# Patient Record
Sex: MALE | Race: WHITE | Employment: FULL TIME | URBAN - METROPOLITAN AREA
[De-identification: names, ages, dates, MRNs, and addresses within clinical notes are randomized per-mention and may not be internally consistent; named-entity substitution may affect disease eponyms.]

---

## 2017-12-08 ENCOUNTER — OFFICE VISIT (OUTPATIENT)
Dept: OTOLARYNGOLOGY | Facility: CLINIC | Age: 47
End: 2017-12-08

## 2017-12-08 VITALS
HEIGHT: 72 IN | SYSTOLIC BLOOD PRESSURE: 112 MMHG | DIASTOLIC BLOOD PRESSURE: 72 MMHG | BODY MASS INDEX: 25.06 KG/M2 | WEIGHT: 185 LBS | TEMPERATURE: 98 F

## 2017-12-08 DIAGNOSIS — R09.81 NASAL CONGESTION: Primary | ICD-10-CM

## 2017-12-08 DIAGNOSIS — R07.0 THROAT PAIN IN ADULT: ICD-10-CM

## 2017-12-08 PROCEDURE — 99203 OFFICE O/P NEW LOW 30 MIN: CPT | Performed by: OTOLARYNGOLOGY

## 2017-12-08 PROCEDURE — 99212 OFFICE O/P EST SF 10 MIN: CPT | Performed by: OTOLARYNGOLOGY

## 2017-12-08 PROCEDURE — 31575 DIAGNOSTIC LARYNGOSCOPY: CPT | Performed by: OTOLARYNGOLOGY

## 2017-12-08 RX ORDER — RANITIDINE 150 MG/1
TABLET ORAL
COMMUNITY
Start: 2017-11-20

## 2017-12-08 RX ORDER — FLUTICASONE PROPIONATE 50 MCG
SPRAY, SUSPENSION (ML) NASAL
COMMUNITY
Start: 2017-10-20 | End: 2017-12-29

## 2017-12-08 RX ORDER — LORATADINE 10 MG/1
10 TABLET ORAL DAILY
Qty: 30 TABLET | Refills: 3 | Status: SHIPPED | OUTPATIENT
Start: 2017-12-08

## 2017-12-08 RX ORDER — OMEPRAZOLE 40 MG/1
CAPSULE, DELAYED RELEASE ORAL
COMMUNITY
Start: 2017-12-04

## 2017-12-08 RX ORDER — MONTELUKAST SODIUM 10 MG/1
10 TABLET ORAL NIGHTLY
Qty: 30 TABLET | Refills: 3 | Status: SHIPPED | OUTPATIENT
Start: 2017-12-08

## 2017-12-08 NOTE — PROGRESS NOTES
Kenji Luna is a 50year old male.   Patient presents with:  Throat Problem: burning sensation and pt feels there is something in his throat for 3-4 months       HISTORY OF PRESENT ILLNESS    He presents with a 3 to four-month history of a burning sensation i intolerance and heat intolerance. Neuro Negative Tremors. Psych Negative Anxiety and depression. Integumentary Negative Frequent skin infections, pigment change and rash. Hema/Lymph Negative Easy bleeding and easy bruising.            PHYSICAL EXAM sprayed into the nose. Laryngoscopy:  Flexible Fiberoptic Laryngoscopy: A diagnostic flexible fiberoptic laryngoscopy was performed. The flexible fiberoptic laryngoscope was placed into the nose or mouthand advanced  into the interior of the larynx.  A t

## 2017-12-18 ENCOUNTER — TELEPHONE (OUTPATIENT)
Dept: OTOLARYNGOLOGY | Facility: CLINIC | Age: 47
End: 2017-12-18

## 2017-12-29 RX ORDER — FLUTICASONE PROPIONATE 50 MCG
1 SPRAY, SUSPENSION (ML) NASAL 2 TIMES DAILY
Qty: 1 BOTTLE | Refills: 3 | Status: SHIPPED | OUTPATIENT
Start: 2017-12-29

## 2018-01-19 ENCOUNTER — OFFICE VISIT (OUTPATIENT)
Dept: OTOLARYNGOLOGY | Facility: CLINIC | Age: 48
End: 2018-01-19

## 2018-01-19 VITALS
HEIGHT: 72 IN | TEMPERATURE: 98 F | SYSTOLIC BLOOD PRESSURE: 114 MMHG | DIASTOLIC BLOOD PRESSURE: 80 MMHG | WEIGHT: 185 LBS | BODY MASS INDEX: 25.06 KG/M2

## 2018-01-19 DIAGNOSIS — J34.2 DEVIATED NASAL SEPTUM: ICD-10-CM

## 2018-01-19 DIAGNOSIS — R07.0 THROAT PAIN IN ADULT: Primary | ICD-10-CM

## 2018-01-19 PROCEDURE — 99212 OFFICE O/P EST SF 10 MIN: CPT | Performed by: OTOLARYNGOLOGY

## 2018-01-19 PROCEDURE — 99214 OFFICE O/P EST MOD 30 MIN: CPT | Performed by: OTOLARYNGOLOGY

## 2018-01-19 NOTE — PROGRESS NOTES
Alejo Clark is a 50year old male. Patient presents with:   Follow - Up: regarding throat pain, no change in symptoms with medications       HISTORY OF PRESENT ILLNESS  He presents with a 3 to four-month history of a burning sensation in the back of his thro OTHER SURGICAL HISTORY Left      Comment: lymph nodes removed from left groin   2015: OTHER SURGICAL HISTORY Left      Comment: achilles tendon      REVIEW OF SYSTEMS    System Neg/Pos Details   Constitutional Negative Fatigue, fever and weight loss.    ENM Nose/Mouth/Throat Normal External nose - Normal. Lips/teeth/gums - Normal. Tonsils - Normal. Oropharynx - Normal.   Nose/Mouth/Throat Normal Nares - Right: Normal Left: Normal. Septum -Normal  Turbinates - Right: Normal, Left: Normal.       Current Outpa

## 2018-01-22 ENCOUNTER — TELEPHONE (OUTPATIENT)
Dept: OTOLARYNGOLOGY | Facility: CLINIC | Age: 48
End: 2018-01-22

## 2018-01-22 RX ORDER — METHYLPREDNISOLONE 4 MG/1
TABLET ORAL
Qty: 1 PACKAGE | Refills: 0 | Status: SHIPPED | OUTPATIENT
Start: 2018-01-22

## 2018-01-22 NOTE — TELEPHONE ENCOUNTER
Dr. Kenia Colindres, per your note from 01/19/18 , medrol dose pack sent to pharmacy,   2. Deviated nasal septum    At this time I have recommended a Medrol Dosepak for 6 days and he will contact me in about 2 weeks to let me know how he is doing.   He may benefit f

## 2018-02-02 ENCOUNTER — TELEPHONE (OUTPATIENT)
Dept: OTOLARYNGOLOGY | Facility: CLINIC | Age: 48
End: 2018-02-02

## 2018-02-02 NOTE — TELEPHONE ENCOUNTER
pt called. LOV 1/19/18 with WINIFRED. He was advised to call to let you know how he is doing on RX meds. Please advise.

## 2018-02-03 NOTE — TELEPHONE ENCOUNTER
Pt's LOV 1/19/18, throat pain in adult, deviated septum. Per pt he still has the same discomfort in his throat; cycles between a feeling that he has something caught in his throat and at times may be painful.   Advised pt to continue his other meds at this

## 2018-02-05 NOTE — TELEPHONE ENCOUNTER
LMTCB. Please give pt number to Gastroenterology, Dr. Adonis Mejia:  23-14-20-09 so he may schedule evaluation.

## 2018-02-05 NOTE — TELEPHONE ENCOUNTER
According to Dr. Michele Collins note, probably would recommend a GI eval and then can consider imaging if needed

## 2018-02-07 NOTE — TELEPHONE ENCOUNTER
LMTCB.   Please give pt number to Gastroenterology, Dr. Shyanne Allison:  23-14-20-09 so he may schedule evaluation.

## 2018-02-08 ENCOUNTER — TELEPHONE (OUTPATIENT)
Dept: OTOLARYNGOLOGY | Facility: CLINIC | Age: 48
End: 2018-02-08

## 2018-02-08 NOTE — TELEPHONE ENCOUNTER
Pt was referred to another doctor, dr Ed Prado. Should pt continue taking medication, refill  or wait to be see by dr Ed Prado. appt is scheduled 3-22-18.    Please call pt to advise

## 2018-02-09 NOTE — TELEPHONE ENCOUNTER
I would recommend that he continue these medications and see me towards the end of February or early March to see how he is doing before he sees his GI doctor.

## 2019-05-10 ENCOUNTER — HOSPITAL ENCOUNTER (OUTPATIENT)
Age: 49
Discharge: HOME | End: 2019-05-10
Attending: EMERGENCY MEDICINE
Payer: COMMERCIAL

## 2019-05-10 ENCOUNTER — APPOINTMENT (OUTPATIENT)
Dept: GENERAL RADIOLOGY | Age: 49
End: 2019-05-10
Attending: EMERGENCY MEDICINE
Payer: COMMERCIAL

## 2019-05-10 VITALS — BODY MASS INDEX: 27.09 KG/M2 | HEIGHT: 72 IN | WEIGHT: 200 LBS

## 2019-05-10 DIAGNOSIS — S80.219A ABRASION OF KNEE, UNSPECIFIED LATERALITY, INITIAL ENCOUNTER: ICD-10-CM

## 2019-05-10 DIAGNOSIS — S80.02XA CONTUSION OF LEFT KNEE, INITIAL ENCOUNTER: Primary | ICD-10-CM

## 2019-05-10 PROCEDURE — 73560 X-RAY EXAM OF KNEE 1 OR 2: CPT | Performed by: EMERGENCY MEDICINE

## 2019-05-10 PROCEDURE — 99203 OFFICE O/P NEW LOW 30 MIN: CPT

## 2019-05-10 PROCEDURE — 99213 OFFICE O/P EST LOW 20 MIN: CPT

## 2019-05-10 NOTE — ED PROVIDER NOTES
Patient Seen in: [address]    History   Patient presents with:  Lower Extremity Injury (musculoskeletal)    Stated Complaint: left knee injury    HPI    25-year-old male without significant past medical history presents erythema or drainage noted. There is a moderate effusion noted to the left knee. Tenderness to palpation to the patella. No tibial or joint line tenderness noted. No laxity noted to the knee.   No other joint tenderness is noted to palpation or range of

## 2019-05-10 NOTE — ED INITIAL ASSESSMENT (HPI)
Pt in [address] [place] [address] by self for left knee injury occurred 1 week ago. He reports pain and swelling. Has taken aleve for pain. Able to bear weight. Stiffens with walking.

## 2019-05-10 NOTE — ED NOTES
Pt discharged home stable and in good condition by self with knee brace on left knee. Reviewed pain and avs. Follow up with ortho. Pt verbalized understanding and agreed.

## 2019-09-20 NOTE — TELEPHONE ENCOUNTER
Current Outpatient Prescriptions:  Fluticasone Propionate 50 MCG/ACT Nasal Suspension  Disp:  Rfl: Please renew Novolog as 8-20 units tid w/ meals as directed for 6 months; ok to renew pen needles for one year. Thanks, SD

## 2022-06-13 ENCOUNTER — OFFICE VISIT (OUTPATIENT)
Dept: FAMILY MEDICINE CLINIC | Facility: CLINIC | Age: 52
End: 2022-06-13
Payer: COMMERCIAL

## 2022-06-13 VITALS
HEIGHT: 72 IN | HEART RATE: 65 BPM | WEIGHT: 186 LBS | BODY MASS INDEX: 25.19 KG/M2 | SYSTOLIC BLOOD PRESSURE: 128 MMHG | OXYGEN SATURATION: 98 % | DIASTOLIC BLOOD PRESSURE: 68 MMHG

## 2022-06-13 DIAGNOSIS — Z00.00 PHYSICAL EXAM, ANNUAL: Primary | ICD-10-CM

## 2022-06-13 DIAGNOSIS — Z12.11 COLON CANCER SCREENING: ICD-10-CM

## 2022-06-13 DIAGNOSIS — L98.9 SKIN LESION: ICD-10-CM

## 2022-06-13 PROCEDURE — 3074F SYST BP LT 130 MM HG: CPT | Performed by: FAMILY MEDICINE

## 2022-06-13 PROCEDURE — 3078F DIAST BP <80 MM HG: CPT | Performed by: FAMILY MEDICINE

## 2022-06-13 PROCEDURE — 99386 PREV VISIT NEW AGE 40-64: CPT | Performed by: FAMILY MEDICINE

## 2022-06-13 PROCEDURE — 3008F BODY MASS INDEX DOCD: CPT | Performed by: FAMILY MEDICINE

## 2022-06-16 ENCOUNTER — MED REC SCAN ONLY (OUTPATIENT)
Dept: FAMILY MEDICINE CLINIC | Facility: CLINIC | Age: 52
End: 2022-06-16

## 2022-06-23 NOTE — PROGRESS NOTES
OV with Dr Carline Downs 06/13/22    Referred to GI for first colonoscopy    No GI symptoms      Anticoagulants:  Diabetic Meds:   BP meds(Ace inhibitors/ARB's):  Weight loss meds (phentermine/vyvanse):  Iron supplement (RX/OTC):  Height & Weight/BMI: 6'/186/25.22  Hx of Cardiac/CVA issues/(MI/Stroke):  Devices Pacemaker/Defibrillator/Stents:  Resp. Issues/Oxygen Use/NUVIA/COPD:  Issues w/Anesthesia:    Symptoms (Y/N):   Symptoms Details:     Special comments/notes:    Please advise on orders and prep, thank you.

## 2022-06-24 ENCOUNTER — NURSE ONLY (OUTPATIENT)
Dept: GASTROENTEROLOGY | Facility: CLINIC | Age: 52
End: 2022-06-24

## 2022-06-24 DIAGNOSIS — Z12.11 SCREEN FOR COLON CANCER: Primary | ICD-10-CM

## 2022-06-24 NOTE — PROGRESS NOTES
Daniel Eng patient for a scheduled telephone colon screening. GI MD preference: [id number]:   [facility]  PCP visit on: 6/13/2022    First colonoscopy: yes     Anticoagulants: no   Diabetic Meds:  No   BP meds(Ace inhibitors/ARB's): no   Weight loss meds (phentermine/vyvanse): no   Iron supplement (RX/OTC): no   Height & Weight/BMI: 6'/186lbs/25  Hx of Cardiac/CVA issues/(MI/Stroke): no  Devices Pacemaker/Defibrillator/Stents: no   Resp. Issues/Oxygen Use/NUVIA/COPD: no   Issues w/Anesthesia: no    Symptoms (Y/N): no     Family history of colon cancer: no     Medications, pharmacy, and allergies verified with patient over the phone. Please advise on orders and prep, thank you.

## 2022-06-29 RX ORDER — POLYETHYLENE GLYCOL 3350, SODIUM CHLORIDE, SODIUM BICARBONATE, POTASSIUM CHLORIDE 420; 11.2; 5.72; 1.48 G/4L; G/4L; G/4L; G/4L
POWDER, FOR SOLUTION ORAL
Qty: 4000 ML | Refills: 0 | Status: SHIPPED | OUTPATIENT
Start: 2022-06-29

## 2022-06-29 NOTE — PROGRESS NOTES
Scheduling:  cln w/ mac or IV w/ Dr. Gracie Hoyos  Dx: crc screening   trilyte split dose sent e-scribe

## 2022-07-01 NOTE — PROGRESS NOTES
Scheduled for:  Colonoscopy-screen 24345    Provider Name:  Dr. Romelia Gaines   Date:  8/10/2022  Location:  [facility]  Sedation:  IV   Time:  2:45 PM (pt is aware to arrive at 1:45 PM)  Prep:  Split dose trilyte   Meds/Allergies Reconciled?:  amilcar [place] reviewed   Diagnosis with codes:  Colorectal cancer screening Z12.11  Was patient informed to call insurance with codes (Y/N):  I confirmed [employer] with this patient. Referral sent?:  Referral was sent at the time of electronic surgical scheduling. [facility] or [facility] notified?:  Electronic case request was sent to [facility] THE [facility] via panpan. Medication Orders:  n/a  Misc Orders:  Patient was informed that they will need a COVID 19 test prior to their procedure. Patient verbally understood & will await a phone call from [facility] to schedule. Further instructions given by staff:  I discussed the prep instructions with the patient which he verbally understood and is aware that I will sent the instructions today via [address]

## 2022-08-09 ENCOUNTER — TELEPHONE (OUTPATIENT)
Dept: GASTROENTEROLOGY | Facility: CLINIC | Age: 52
End: 2022-08-09

## 2022-08-09 DIAGNOSIS — Z12.11 COLON CANCER SCREENING: Primary | ICD-10-CM

## 2022-08-09 NOTE — TELEPHONE ENCOUNTER
Patient has colonoscopy tomorrow and indicates his wife tested positive for covid today. Patient asking what needs to be done next, if he needs to test or reschedule.  Please call at [phone],[facility],

## 2022-08-09 NOTE — TELEPHONE ENCOUNTER
Contacted ANDRES RN in [facility] and spoke to [place] regarding patient's wife testing positive for covid today. States patietn will need to be rescheduled at least 22 days from today. GI Schedulers--    Please see TE 6/24/22 to reschedule patient 22 days out from today. Thank you!

## 2022-08-10 NOTE — TELEPHONE ENCOUNTER
Dr Ketan Osman   Patients procedure has been rescheduled further out due to wife of patient has come back covid positive     Patient had already opened his bowel prep and is requesting to resend a new prescription over to his [facility] pharmacy on file     Thank you     [id number] [address] [facility] [address] [facility] AT [address] [place] [address], [phone] [address]   Phone: [phone] Fax: [phone]   Hours: Not open 24 hours

## 2022-08-10 NOTE — TELEPHONE ENCOUNTER
Patients case rescheduled further out due to wife positive with Covid case rescheduled at [address] and misc report completed         Rescheduled for:  Colonoscopy-screen [address]57    Provider Name:  Dr. Elva Miramontes   Date:from  8/10/2022                               To 09/26/2022   Location:  [facility]                                          To [facility]   Sedation:  IV                                               To MAC   Time:  2:45 PM                                              To 0945 (pt is aware that [address] will call with time of arrival   Prep:  Split dose trilyte   Meds/Allergies Reconciled?:  amilcar Jensen reviewed   Diagnosis with codes:  Colorectal cancer screening Z12.11  Was patient informed to call insurance with codes (Y/N):  I confirmed [employer] with this patient. Referral sent?:  Referral was sent at the time of electronic surgical scheduling. [facility] or [address] notified?:  Electronic case request was sent to [facility] THE [facility] via LetMeHearYa. Medication Orders:  n/a  Misc Orders:  Patient was informed that they will need a COVID 19 test prior to their procedure. Patient verbally understood & will await a phone call from [facility] to schedule. Further instructions given by staff:  I discussed the prep instructions with the patient which he verbally understood and is aware that I will sent the instructions today via [address]

## 2022-09-22 ENCOUNTER — TELEPHONE (OUTPATIENT)
Dept: GASTROENTEROLOGY | Facility: CLINIC | Age: 52
End: 2022-09-22

## 2022-09-22 NOTE — TELEPHONE ENCOUNTER
Iris and asked to fill medication sent on 6/29/22. States patient picked up medication but they can refill it. They will get medication ready for patient.

## 2022-09-23 NOTE — TELEPHONE ENCOUNTER
Good morning Joyce,    Patient is scheduled for colonoscopy on 9/26/22, and did not pickup prep. It was ordered back in June.     Thank you,  Flaco Del Angel

## 2022-09-26 ENCOUNTER — SURGERY CENTER DOCUMENTATION (OUTPATIENT)
Dept: SURGERY | Age: 52
End: 2022-09-26

## 2022-09-26 ENCOUNTER — LAB REQUISITION (OUTPATIENT)
Dept: SURGERY | Age: 52
End: 2022-09-26

## 2022-09-26 DIAGNOSIS — Z12.11 SCREEN FOR COLON CANCER: ICD-10-CM

## 2022-09-26 DIAGNOSIS — K63.5 POLYP OF COLON, UNSPECIFIED PART OF COLON, UNSPECIFIED TYPE: ICD-10-CM

## 2022-09-26 DIAGNOSIS — K57.30 DIVERTICULA OF COLON: ICD-10-CM

## 2022-09-26 PROCEDURE — 88305 TISSUE EXAM BY PATHOLOGIST: CPT | Performed by: INTERNAL MEDICINE

## 2022-09-26 RX ORDER — POLYETHYLENE GLYCOL 3350, SODIUM CHLORIDE, SODIUM BICARBONATE, POTASSIUM CHLORIDE 420; 11.2; 5.72; 1.48 G/4L; G/4L; G/4L; G/4L
POWDER, FOR SOLUTION ORAL
Qty: 4000 ML | Refills: 0 | Status: SHIPPED | OUTPATIENT
Start: 2022-09-26

## 2022-10-03 ENCOUNTER — TELEPHONE (OUTPATIENT)
Dept: GASTROENTEROLOGY | Facility: CLINIC | Age: 52
End: 2022-10-03

## 2022-10-03 NOTE — TELEPHONE ENCOUNTER
I mailed out colonoscopy results letter to pt  Updated health maintenance  Entered into 7 yr CLN recall  Recall colon in 7 years per. Colon done 9/26/2022    Janeth Rivero MD  [facility] Clinical Staff  GI staff: please place recall for colonoscopy in 7 years.